# Patient Record
Sex: MALE | Race: WHITE | Employment: FULL TIME | URBAN - METROPOLITAN AREA
[De-identification: names, ages, dates, MRNs, and addresses within clinical notes are randomized per-mention and may not be internally consistent; named-entity substitution may affect disease eponyms.]

---
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
## 2022-05-08 ENCOUNTER — HOSPITAL ENCOUNTER (EMERGENCY)
Age: 22
Discharge: HOME | End: 2022-05-09
Payer: COMMERCIAL

## 2022-05-08 DIAGNOSIS — H60.393 INFECTIVE OTITIS EXTERNA OF BOTH EARS: Primary | ICD-10-CM

## 2022-05-08 PROCEDURE — 99283 EMERGENCY DEPT VISIT LOW MDM: CPT

## 2022-05-08 RX ORDER — CIPROFLOXACIN AND DEXAMETHASONE 3; 1 MG/ML; MG/ML
4 SUSPENSION/ DROPS AURICULAR (OTIC) 2 TIMES DAILY
Qty: 7.5 ML | Refills: 0 | Status: SHIPPED | OUTPATIENT
Start: 2022-05-08 | End: 2022-05-15

## 2022-05-08 RX ORDER — CIPROFLOXACIN HYDROCHLORIDE 3.5 MG/ML
4 SOLUTION/ DROPS TOPICAL 2 TIMES DAILY
Status: DISCONTINUED | OUTPATIENT
Start: 2022-05-09 | End: 2022-05-09 | Stop reason: CLARIF

## 2022-05-08 RX ORDER — DEXAMETHASONE SODIUM PHOSPHATE 1 MG/ML
4 SOLUTION/ DROPS OPHTHALMIC 2 TIMES DAILY
Status: DISCONTINUED | OUTPATIENT
Start: 2022-05-09 | End: 2022-05-09 | Stop reason: CLARIF

## 2022-05-08 RX ORDER — CIPROFLOXACIN AND DEXAMETHASONE 3; 1 MG/ML; MG/ML
4 SUSPENSION/ DROPS AURICULAR (OTIC) 2 TIMES DAILY
Status: DISCONTINUED | OUTPATIENT
Start: 2022-05-08 | End: 2022-05-08 | Stop reason: CLARIF

## 2022-05-08 ASSESSMENT — PAIN DESCRIPTION - ORIENTATION: ORIENTATION: LEFT

## 2022-05-08 ASSESSMENT — PAIN DESCRIPTION - DESCRIPTORS: DESCRIPTORS: ACHING

## 2022-05-08 ASSESSMENT — PAIN DESCRIPTION - PAIN TYPE: TYPE: ACUTE PAIN

## 2022-05-08 ASSESSMENT — PAIN SCALES - GENERAL: PAINLEVEL_OUTOF10: 3

## 2022-05-08 ASSESSMENT — PAIN - FUNCTIONAL ASSESSMENT: PAIN_FUNCTIONAL_ASSESSMENT: 0-10

## 2022-05-08 ASSESSMENT — PAIN DESCRIPTION - LOCATION: LOCATION: EAR

## 2022-05-09 VITALS
DIASTOLIC BLOOD PRESSURE: 87 MMHG | HEART RATE: 80 BPM | TEMPERATURE: 98 F | SYSTOLIC BLOOD PRESSURE: 130 MMHG | HEIGHT: 75 IN | BODY MASS INDEX: 29.22 KG/M2 | WEIGHT: 235 LBS | RESPIRATION RATE: 18 BRPM | OXYGEN SATURATION: 100 %

## 2022-05-09 PROCEDURE — 6370000000 HC RX 637 (ALT 250 FOR IP): Performed by: PHYSICIAN ASSISTANT

## 2022-05-09 RX ORDER — CIPROFLOXACIN AND DEXAMETHASONE 3; 1 MG/ML; MG/ML
4 SUSPENSION/ DROPS AURICULAR (OTIC) 2 TIMES DAILY
Status: DISCONTINUED | OUTPATIENT
Start: 2022-05-09 | End: 2022-05-09 | Stop reason: HOSPADM

## 2022-05-09 RX ADMIN — CIPROFLOXACIN AND DEXAMETHASONE 4 DROP: 3; 1 SUSPENSION/ DROPS AURICULAR (OTIC) at 00:54

## 2022-05-09 NOTE — ED PROVIDER NOTES
[address] [facility]  EMERGENCY DEPARTMENT ENCOUNTER        Pt Name: Aryan Ovalle  MRN: [id number]  [place] 2000  Date of evaluation: 5/8/2022  Provider: RUSSELL Solis  PCP: Antonino Hamilton MD    This patient was not seen and evaluated by the attending physician No att. providers found. I have evaluated this patient. My supervising physician was available for consultation. CHIEF COMPLAINT       Chief Complaint   Patient presents with   Wava Nip     left ear pain starting this morning        HISTORY OF PRESENT ILLNESS   (Location/Symptom, Timing/Onset, Context/Setting, Quality, Duration, Modifying Factors, Severity)  Note limiting factors. Aryan Ovalle is a 25 y.o. male who presents via private vehicle from his home for ear pain. Patient notes for the last 1-1/2 days he has been having pain to his left ear canal.  When he started having pain he put Neosporin on a Q-tip and put that in there without any improvement. He endorses the feeling of ear drainage denies seeing any ear drainage. He denies any hearing changes. He endorses intermittent balance changes but no consistent constant dizziness no nausea no vomiting. He denies any fevers body aches chills no nasal congestion sore throat headache fever. He wears ear protection at work daily, with multiple pieces of foam that fit in the external auditory canal.    Nursing Notes were all reviewed and agreed with or any disagreements were addressed  in the HPI. Pt was seen during the [place] 19 pandemic. Appropriate PPE worn by ME during patient encounters. Pt seen during a time with constrained hospital bed capacity and other potential inpatient and outpatient resources were constrained due to the viral pandemic. REVIEW OF SYSTEMS    (2-9 systems for level 4, 10 or more for level 5)     Review of Systems    Positives and Pertinent negatives as per HPI.   Except as noted abovein the ROS, all other systems were reviewed and negative. PAST MEDICAL HISTORY     Past Medical History:   Diagnosis Date    Anxiety     C. difficile diarrhea     Headache(784.0)     Obesity     Pancreatitis 2012    admission children[facility]    Vision abnormalities          SURGICAL HISTORY   History reviewed. No pertinent surgical history.       CURRENTMEDICATIONS       Previous Medications    FLUOXETINE (PROZAC) 20 MG CAPSULE    Take 1 capsule by mouth daily    IBUPROFEN (IBU) 600 MG TABLET    Take 1 tablet by mouth every 8 hours as needed for Pain         ALLERGIES     Amoxicillin-pot clavulanate    FAMILYHISTORY       Family History   Problem Relation Age of Onset    Substance Abuse Mother     Arthritis Father     Depression Father     Substance Abuse Father     Substance Abuse Sister     Substance Abuse Brother     Substance Abuse Maternal Aunt     Heart Disease Maternal Uncle     Arthritis Maternal Uncle     Substance Abuse Maternal Uncle     Vision Loss Maternal Uncle     Heart Disease Paternal Aunt     Substance Abuse Paternal Aunt     Vision Loss Paternal Aunt     Substance Abuse Paternal Uncle     Vision Loss Paternal Uncle     Diabetes Maternal Grandmother     Substance Abuse Maternal Grandmother     Vision Loss Maternal Grandmother     Diabetes Maternal Grandfather     Hearing Loss Maternal Grandfather     Heart Disease Maternal Grandfather     Substance Abuse Maternal Grandfather     Vision Loss Maternal Grandfather     Substance Abuse Paternal Grandmother     Stroke Paternal Grandfather     Substance Abuse Paternal Grandfather     Substance Abuse Maternal Cousin     Substance Abuse Paternal Cousin           SOCIAL HISTORY       Social History     Socioeconomic History    Marital status: Single     Spouse name: None    Number of children: None    Years of education: None    Highest education level: None   Occupational History    None   Tobacco Use    Smoking status: Current Every Day Smoker     Types: Cigarettes, E-Cigarettes    Smokeless tobacco: Never Used   Substance and Sexual Activity    Alcohol use: No    Drug use: No    Sexual activity: Never   Other Topics Concern    None   Social History Narrative    None     Social Determinants of Health     Financial Resource Strain:     Difficulty of Paying Living Expenses: Not on file   Food Insecurity:     Worried About Running Out of Food in the Last Year: Not on file    Nam of Food in the Last Year: Not on file   Transportation Needs:     Lack of Transportation (Medical): Not on file    Lack of Transportation (Non-Medical): Not on file   Physical Activity:     Days of Exercise per Week: Not on file    Minutes of Exercise per Session: Not on file   Stress:     Feeling of Stress : Not on file   Social Connections:     Frequency of Communication with Friends and Family: Not on file    Frequency of Social Gatherings with Friends and Family: Not on file    Attends Buddhism Services: Not on file    Active Member of [address] [place] [address] or Organizations: Not on file    Attends Club or Organization Meetings: Not on file    Marital Status: Not on file   Intimate Partner Violence:     Fear of Current or Ex-Partner: Not on file    Emotionally Abused: Not on file    Physically Abused: Not on file    Sexually Abused: Not on file   Housing Stability:     Unable to Pay for Housing in the Last Year: Not on file    Number of [place] in the Last Year: Not on file    Unstable Housing in the Last Year: Not on file       SCREENINGS    [place] Coma Scale  Eye Opening: Spontaneous  Best Verbal Response: Oriented  Best Motor Response: Obeys commands  [place] Coma Scale Score: 15        PHYSICAL EXAM    (up to 7 for level 4, 8 or more for level 5)     ED Triage Vitals [05/08/22 2227]   BP Temp Temp Source Pulse Resp SpO2 Height Weight   (!) 141/84 98 °F (36.7 °C) Oral 83 18 98 % 6' 3\" (1.905 m) 235 lb (106.6 kg)       Physical Exam  Vitals and nursing note reviewed. Constitutional:       General: He is awake. He is not in acute distress. Appearance: Normal appearance. He is well-developed. He is not ill-appearing, toxic-appearing or diaphoretic. HENT:      Head: Normocephalic and atraumatic. Jaw: There is normal jaw occlusion. Right Ear: Hearing, tympanic membrane and external ear normal. Drainage and swelling present. No foreign body. No mastoid tenderness. Left Ear: External ear normal. Drainage, swelling and tenderness present. No foreign body. No mastoid tenderness. Ears:      Comments: Left ear canal is erythematous edematous with narrowing of the EAC. Unable to visualize TM. Right ear canal is erythematous and edematous with mild narrowing of EAC no tenderness. Tm intact without purulence      Nose: Nose normal.      Mouth/Throat:      Mouth: Mucous membranes are moist.      Pharynx: Oropharynx is clear. Eyes:      General:         Right eye: No discharge. Left eye: No discharge. Extraocular Movements: Extraocular movements intact. Conjunctiva/sclera: Conjunctivae normal.      Pupils: Pupils are equal, round, and reactive to light. Cardiovascular:      Rate and Rhythm: Normal rate and regular rhythm. Pulses: Normal pulses. Heart sounds: Normal heart sounds. No murmur heard. No friction rub. No gallop. Pulmonary:      Effort: Pulmonary effort is normal. No respiratory distress. Breath sounds: Normal breath sounds. No wheezing or rales. Musculoskeletal:      Cervical back: Normal range of motion and neck supple. No rigidity. Lymphadenopathy:      Cervical: No cervical adenopathy. Skin:     General: Skin is warm and dry. Neurological:      General: No focal deficit present. Mental Status: He is alert, oriented to person, place, and time and easily aroused. Psychiatric:         Behavior: Behavior normal. Behavior is cooperative.            DIAGNOSTIC RESULTS   LABS:    Labs Reviewed - No data to display    All other labs were within normal range or not returned as of this dictation. EKG: All EKG's are interpreted by the Emergency Department Physician who either signs orCo-signs this chart in the absence of a cardiologist.  Please see their note for interpretation of EKG. RADIOLOGY:   Non-plain film images such as CT, Ultrasound and MRI are read by the radiologist. Plain radiographic images are visualized andpreliminarily interpreted by the  ED Provider with the below findings:        Interpretation perthe Radiologist below, if available at the time of this note:    No orders to display     No results found. PROCEDURES   Unless otherwise noted below, none     Procedures    CRITICAL CARE TIME   N/A    CONSULTS:  None      EMERGENCY DEPARTMENT COURSE and DIFFERENTIALDIAGNOSIS/MDM:   Vitals:    Vitals:    05/08/22 2227   BP: (!) 141/84   Pulse: 83   Resp: 18   Temp: 98 °F (36.7 °C)   TempSrc: Oral   SpO2: 98%   Weight: 235 lb (106.6 kg)   Height: 6' 3\" (1.905 m)       Patient was given thefollowing medications:  Medications   ciprofloxacin-dexamethasone (CIPRODEX) otic suspension 4 drop (has no administration in time range)       PDMP Monitoring:    Last PDMP Jae as Reviewed [facility]):  Review User Review Instant Review Result            Urine Drug Screenings (1 yr)    No resulted procedures found. Medication Contract and Consent for Opioid Use Documents Filed      No documents found                MDM:   Patient seen and evaluated. Old records reviewed. Diagnostic testing reviewed and results discussed. I have independently evaluated this patient based upon my scope of practice. Supervising physician was in the department for consultation as needed. 26-year-old male presents for evaluation of otalgia. Physical examination is indicative of otitis externa bilaterally. Patient started on Ciprodex in the department.   He will be discharged home with outpatient prescription for Ciprodex and instructions on follow-up and strict ER return precautions. Based on patient's clinical history and clinical findings I currently estimate there is low risk for: bacterial sinusitis, viral/strep pharyngitis, malignant otitis  externa, RPA, PTA,  dental infection, parotitis, mastoiditis, Noriega Palsy, barosinusitis, temporal arteritis. We discussed signs and symptoms that would warrant return to the ED (hearing loss, dizziness, bleeding, worsening ear pain, fever, worsening headache, vision loss, eye pain). Pt is in agreement with the current plan and all questions were addressed. Discharge Time out:  CC Reviewed Yes   Test Results Yes     Vitals:    05/08/22 2227   BP: (!) 141/84   Pulse: 83   Resp: 18   Temp: 98 °F (36.7 °C)   SpO2: 98%              FINAL IMPRESSION      1.  Infective otitis externa of both ears          DISPOSITION/PLAN   DISPOSITION Decision To Discharge 05/08/2022 11:44:27 PM      PATIENT REFERREDTO:  Leah Yarbrough MD  [address] [address]  [phone]    Schedule an appointment as soon as possible for a visit   For a recheck in    10-12 days, sooner if no improvement or worsening of symptoms    [facility] ED  [address] [facility]  Go to   If symptoms worsen      DISCHARGE MEDICATIONS:  New Prescriptions    CIPROFLOXACIN-DEXAMETHASONE (CIPRODEX) 0.3-0.1 % OTIC SUSPENSION    Place 4 drops into both ears 2 times daily for 7 days       DISCONTINUED MEDICATIONS:  Discontinued Medications    No medications on file              (Please note that portions ofthis note were completed with a voice recognition program.  Efforts were made to edit the dictations but occasionally words are mis-transcribed.)    Aazel Meyer (electronically signed)       Azael Meyer  05/09/22 0028

## 2023-05-24 ENCOUNTER — HOSPITAL ENCOUNTER (EMERGENCY)
Age: 23
Discharge: HOME | End: 2023-05-24
Payer: COMMERCIAL

## 2023-05-24 VITALS
OXYGEN SATURATION: 98 % | BODY MASS INDEX: 30.46 KG/M2 | HEIGHT: 75 IN | SYSTOLIC BLOOD PRESSURE: 119 MMHG | DIASTOLIC BLOOD PRESSURE: 79 MMHG | RESPIRATION RATE: 19 BRPM | WEIGHT: 245 LBS | HEART RATE: 76 BPM | TEMPERATURE: 98.2 F

## 2023-05-24 DIAGNOSIS — H65.00 ACUTE SEROUS OTITIS MEDIA, RECURRENCE NOT SPECIFIED, UNSPECIFIED LATERALITY: Primary | ICD-10-CM

## 2023-05-24 PROCEDURE — 6370000000 HC RX 637 (ALT 250 FOR IP): Performed by: PHYSICIAN ASSISTANT

## 2023-05-24 PROCEDURE — 99283 EMERGENCY DEPT VISIT LOW MDM: CPT

## 2023-05-24 RX ORDER — AZITHROMYCIN 250 MG/1
250 TABLET, FILM COATED ORAL SEE ADMIN INSTRUCTIONS
Qty: 6 TABLET | Refills: 0 | Status: SHIPPED | OUTPATIENT
Start: 2023-05-24 | End: 2023-05-29

## 2023-05-24 RX ORDER — AZITHROMYCIN 250 MG/1
500 TABLET, FILM COATED ORAL ONCE
Status: COMPLETED | OUTPATIENT
Start: 2023-05-24 | End: 2023-05-24

## 2023-05-24 RX ORDER — IBUPROFEN 400 MG/1
400 TABLET ORAL ONCE
Status: COMPLETED | OUTPATIENT
Start: 2023-05-24 | End: 2023-05-24

## 2023-05-24 RX ADMIN — AZITHROMYCIN 500 MG: 250 TABLET, FILM COATED ORAL at 19:05

## 2023-05-24 RX ADMIN — IBUPROFEN 400 MG: 400 TABLET, FILM COATED ORAL at 19:05

## 2023-05-24 ASSESSMENT — ENCOUNTER SYMPTOMS
RESPIRATORY NEGATIVE: 1
GASTROINTESTINAL NEGATIVE: 1

## 2023-05-24 ASSESSMENT — PAIN DESCRIPTION - LOCATION: LOCATION: EAR

## 2023-05-24 ASSESSMENT — PAIN - FUNCTIONAL ASSESSMENT
PAIN_FUNCTIONAL_ASSESSMENT: 0-10
PAIN_FUNCTIONAL_ASSESSMENT: 0-10

## 2023-05-24 ASSESSMENT — PAIN SCALES - GENERAL
PAINLEVEL_OUTOF10: 4
PAINLEVEL_OUTOF10: 7

## 2023-05-24 NOTE — DISCHARGE INSTRUCTIONS
Continue with the Zithromax as prescribed. Please follow with ENT. Return to the ER if you develop any new or worsening symptoms.

## 2023-05-24 NOTE — ED PROVIDER NOTES
[address] [facility]  EMERGENCY DEPARTMENT ENCOUNTER        Pt Name: Jen Eaton  MRN: [id number]  [place] 2000  Date of evaluation: 5/24/2023  Provider: Edmond Johnson PA-C  PCP: Roddy Quinn MD  Note Started: 7:08 PM EDT 5/24/23      ELYSIA. I have evaluated this patient. CHIEF COMPLAINT       Chief Complaint   Patient presents with    Otitis Media     Patient comes in due to \"I believe I have an ear infection. \" Patient states he has had clear drainage and pain in his L ear that started yesterday. HISTORY OF PRESENT ILLNESS: 1 or more Elements     History From: patient  Limitations to history : None    Jen Eaton is a 26 y.o. male with past medical history of tobacco use anxiety obesity brought in today by private vehicle with mother with complaints of left ear pain. Onset of symptoms started yesterday. Duration of symptoms have been persistent since onset. Context includes left ear pain. He denies fevers or chills. Denies cough or congestion. Denies pharyngitis. Denies chest pain or shortness of breath. Denies nausea vomiting diarrhea. Denies any recent injury or trauma. Denies recent swimming. States he noticed some clear fluid as well. Has not taken anything for the pain today. Rates current pain a 7 out of 10. No radiation. Nothing seems to make symptoms better or worse. Otherwise denies any other complaints. Nursing Notes were all reviewed and agreed with or any disagreements were addressed in the HPI. REVIEW OF SYSTEMS :      Review of Systems   Constitutional: Negative. HENT:  Positive for ear pain. Respiratory: Negative. Cardiovascular: Negative. Gastrointestinal: Negative. Musculoskeletal: Negative. Skin: Negative. Neurological: Negative. Positives and Pertinent negatives as per HPI. SURGICAL HISTORY   History reviewed. No pertinent surgical history.     CURRENTMEDICATIONS       Previous Medications    No